# Patient Record
Sex: FEMALE | Race: WHITE | NOT HISPANIC OR LATINO | Employment: FULL TIME | URBAN - METROPOLITAN AREA
[De-identification: names, ages, dates, MRNs, and addresses within clinical notes are randomized per-mention and may not be internally consistent; named-entity substitution may affect disease eponyms.]

---

## 2023-05-26 ENCOUNTER — HOSPITAL ENCOUNTER (OUTPATIENT)
Dept: PREADMISSION TESTING | Facility: HOSPITAL | Age: 43
Discharge: HOME | End: 2023-05-26
Attending: INTERNAL MEDICINE

## 2023-05-26 DIAGNOSIS — Z12.11 COLON CANCER SCREENING: ICD-10-CM

## 2024-01-07 ENCOUNTER — OFFICE VISIT (OUTPATIENT)
Dept: URGENT CARE | Facility: CLINIC | Age: 44
End: 2024-01-07
Payer: COMMERCIAL

## 2024-01-07 VITALS
WEIGHT: 161 LBS | HEIGHT: 64 IN | HEART RATE: 93 BPM | SYSTOLIC BLOOD PRESSURE: 140 MMHG | RESPIRATION RATE: 19 BRPM | TEMPERATURE: 98 F | DIASTOLIC BLOOD PRESSURE: 88 MMHG | OXYGEN SATURATION: 97 % | BODY MASS INDEX: 27.49 KG/M2

## 2024-01-07 DIAGNOSIS — R03.0 ELEVATED BLOOD PRESSURE READING IN OFFICE WITHOUT DIAGNOSIS OF HYPERTENSION: ICD-10-CM

## 2024-01-07 DIAGNOSIS — J34.89 SINUS PRESSURE: Primary | ICD-10-CM

## 2024-01-07 PROCEDURE — 99203 OFFICE O/P NEW LOW 30 MIN: CPT | Mod: S$GLB,,,

## 2024-01-07 RX ORDER — DEXTROMETHORPHAN HYDROBROMIDE AND GUAIFENESIN 10; 200 MG/1; MG/1
2 CAPSULE, GELATIN COATED ORAL EVERY 4 HOURS PRN
Qty: 20 EACH | Refills: 0 | Status: SHIPPED | OUTPATIENT
Start: 2024-01-07 | End: 2024-01-12

## 2024-01-07 RX ORDER — DEXTROAMPHETAMINE SULFATE 15 MG/1
15 CAPSULE, EXTENDED RELEASE ORAL 3 TIMES DAILY
COMMUNITY
Start: 2023-12-14

## 2024-01-07 NOTE — PROGRESS NOTES
"Subjective:      Patient ID: Rubina Park is a 43 y.o. female.    Vitals:  height is 5' 4" (1.626 m) and weight is 73 kg (161 lb). Her oral temperature is 98.3 °F (36.8 °C). Her blood pressure is 140/88 (abnormal) and her pulse is 93. Her respiration is 19 and oxygen saturation is 97%.     Chief Complaint: Sinus Problem    44 yo female Patient presents to clinic with sinus pressure/congestion onset 2 days, no recorded fever. Noticed BP was elevated in clinic without hx of HTN. Denies headache, vision changes, weakness, fatigue, speech difficulty, back pain, cp, sob. Sates she has been taking 2 different decongestants. No known sick contacts. "I was not sure if I needed a steroid shot or anything but I figured I would come in because I was checking in my daughter." NKDA.     Sinus Problem  This is a new problem. The current episode started in the past 7 days. There has been no fever. Associated symptoms include congestion, headaches and sinus pressure. Pertinent negatives include no chills, coughing, ear pain, neck pain, shortness of breath, sneezing or sore throat.       Constitution: Negative for chills and fever.   HENT:  Positive for congestion and sinus pressure. Negative for ear pain, ear discharge, sinus pain, sore throat, trouble swallowing and voice change.    Neck: Negative for neck pain and neck stiffness.   Cardiovascular:  Negative for chest pain.   Eyes:  Negative for eye discharge, eye itching, eye redness, double vision and blurred vision.   Respiratory:  Negative for cough and shortness of breath.    Allergic/Immunologic: Negative for sneezing.   Neurological:  Positive for headaches.      Objective:     Vitals:    01/07/24 1644   BP: (!) 140/88   Pulse:    Resp:    Temp:        Physical Exam   Constitutional: She is oriented to person, place, and time. She appears well-developed. She is cooperative.  Non-toxic appearance. She does not appear ill. No distress.   HENT:   Head: Normocephalic and " atraumatic.   Ears:   Right Ear: Hearing, tympanic membrane, external ear and ear canal normal. No cerumen not present. Tympanic membrane is not erythematous and not bulging.   Left Ear: Hearing, tympanic membrane, external ear and ear canal normal. No cerumen not present. Tympanic membrane is not erythematous and not bulging.   Nose: Nose normal. No mucosal edema, rhinorrhea or nasal deformity. No epistaxis. Right sinus exhibits no maxillary sinus tenderness and no frontal sinus tenderness. Left sinus exhibits no maxillary sinus tenderness and no frontal sinus tenderness.   Mouth/Throat: Uvula is midline, oropharynx is clear and moist and mucous membranes are normal. Mucous membranes are moist. No trismus in the jaw. Normal dentition. No uvula swelling. No oropharyngeal exudate, posterior oropharyngeal edema, posterior oropharyngeal erythema or cobblestoning.   Eyes: Conjunctivae and lids are normal. Pupils are equal, round, and reactive to light. Right eye exhibits no discharge. Left eye exhibits no discharge. No scleral icterus.   Neck: Trachea normal and phonation normal. Neck supple. No edema present. No erythema present. No neck rigidity present.   Cardiovascular: Normal rate, regular rhythm, normal heart sounds and normal pulses.   Pulmonary/Chest: Effort normal and breath sounds normal. No accessory muscle usage or stridor. No respiratory distress. She has no decreased breath sounds. She has no wheezes. She has no rhonchi. She has no rales.   Abdominal: Normal appearance.   Musculoskeletal: Normal range of motion.         General: No deformity. Normal range of motion.   Neurological: She is alert and oriented to person, place, and time. She displays no weakness. She exhibits normal muscle tone. Coordination and gait normal.   Skin: Skin is warm, dry, intact, not diaphoretic, not pale and no rash.   Psychiatric: Her speech is normal and behavior is normal. Judgment and thought content normal.   Nursing note  and vitals reviewed.      Assessment:     1. Sinus pressure    2. Elevated blood pressure reading in office without diagnosis of hypertension        Plan:       Sinus pressure  -     dextromethorphan-guaiFENesin (CORICIDIN HBP CHEST GRICEL-COUGH)  mg Cap; Take 2 capsules by mouth every 4 (four) hours as needed (congestion).  Dispense: 20 each; Refill: 0    Elevated blood pressure reading in office without diagnosis of hypertension        Patient Instructions   PLEASE READ YOUR DISCHARGE INSTRUCTIONS ENTIRELY AS IT CONTAINS IMPORTANT INFORMATION.      - Please drink plenty of fluids.  - Please get plenty of rest.  - You can take plain Mucinex (guaifenesin) 1200 mg twice a day to help loosen mucous.   - Use over the counter Flonase as directed  Please return here or go to the Emergency Department for any concerns or worsening of condition.  - Please take an over the counter antihistamine medication (Allegra/Claritin/Zyrtec/Xyzal) of your choice as directed. These are antihistamines that can help with runny nose, nasal congestion, sneezing, and helps to dry up post-nasal drip, which usually causes sore throat and cough.    -If you do NOT have high blood pressure, you may use a decongestant form (D)  of this medication (ie. Claritin- D, zyrtec-D, allegra-D, Mucinex-D) or if you do not take the D form, you can take sudafed (pseudoephedrine) over the counter, which is a decongestant. Do NOT take two decongestant (D) medications at the same time (such as mucinex-D and claritin-D or plain sudafed and claritin D). Dextromethorphan (DM) is a cough suppressant over the counter (ie. mucinex DM, robitussin, delsym; dayquil/nyquil has DM as well.)    If you do have Hypertension or palpitations, it is safe to take Coricidin HBP for relief of sinus symptoms.    - If not allergic, please take over the counter Tylenol (Acetaminophen) and/or Motrin (Ibuprofen) as directed for control of pain and/or fever.  Avoid tylenol if you  have a history of liver disease. Do not take ibuprofen if you have a history of GI bleeding, kidney disease, or if you take blood thinners.  Please follow up with your primary care doctor or specialist as needed.    -IF YOU RECEIVED PRESCRIPTION COUGH SUPPRESSANTS: Take prescription cough meds (pills) as prescribed; take prescription cough syrup at night as needed for cough.  Do not take both the prescribed cough pills and syrup at the same time or within 6 hours of each other.  Do not take the cough syrup with any other sedative medication as it can can cause drowsiness. Do not operate any heavy machinery, drink or drive while taking the cough syrup.    Try taking half a dose first of the cough syrup to see how it affects you.     Sore throat recommendations: Warm fluids, warm salt water gargles, throat lozenges, tea, honey, soup, rest, hydration.    Use over the counter flonase: one spray each nostril twice daily OR two sprays each nostril once daily for sinus congestion and postnasal drip. This is a steroid nasal spray that works locally over time to decrease the inflammation in your nose/sinuses and help with allergic symptoms. This is not an quick- relief spray like afrin, but it works well if used daily.  Discontinue if you develop nose bleed    Sinus rinses DO NOT USE TAP WATER, if you must, water must be a rolling boil for 1 minute, let it cool, then use.  May use distilled water, or over the counter nasal saline rinses.  Vics vapor rub in shower to help open nasal passages.  May use nasal gel to keep passages moisturized.  May use Nasal saline sprays during the day for added relief of congestion.   For those who go to the gym, please do not use the sauna or steam room now to clear sinuses.    If you  smoke, please stop smoking.      Please return or see your primary care doctor if you develop new or worsening symptoms.     Please arrange follow up with your primary medical clinic as soon as possible. You  must understand that you've received an Urgent Care treatment only and that you may be released before all of your medical problems are known or treated. You, the patient, will arrange for follow up as instructed. If your symptoms worsen or fail to improve you should go to the Emergency Room.      Medical Decision Making:   Urgent Care Management:  Rechecked BP manually with improvement to 144/88. Without history of HTN, suspect elevated BP was due to cough medications. Prescribed Coricidin to treat symptoms. Advised patient that symptoms are indicative of an upper respiratory infection which is viral in nature and should be treated symptomatically.  We discussed over-the-counter medications as well as home remedies to help with current symptoms.  We also discussed a wait and see antibiotic plan which the patient verbalized understanding.  Patient educational handouts also included in discharge paperwork for patient who verbalized understanding agrees with plan of care.  They deny any further questions or concerns at this time.  Patient exits exam room in no acute distress.

## 2024-01-07 NOTE — PATIENT INSTRUCTIONS
PLEASE READ YOUR DISCHARGE INSTRUCTIONS ENTIRELY AS IT CONTAINS IMPORTANT INFORMATION.      - Please drink plenty of fluids.  - Please get plenty of rest.  - You can take plain Mucinex (guaifenesin) 1200 mg twice a day to help loosen mucous.   - Use over the counter Flonase as directed  Please return here or go to the Emergency Department for any concerns or worsening of condition.  - Please take an over the counter antihistamine medication (Allegra/Claritin/Zyrtec/Xyzal) of your choice as directed. These are antihistamines that can help with runny nose, nasal congestion, sneezing, and helps to dry up post-nasal drip, which usually causes sore throat and cough.    -If you do NOT have high blood pressure, you may use a decongestant form (D)  of this medication (ie. Claritin- D, zyrtec-D, allegra-D, Mucinex-D) or if you do not take the D form, you can take sudafed (pseudoephedrine) over the counter, which is a decongestant. Do NOT take two decongestant (D) medications at the same time (such as mucinex-D and claritin-D or plain sudafed and claritin D). Dextromethorphan (DM) is a cough suppressant over the counter (ie. mucinex DM, robitussin, delsym; dayquil/nyquil has DM as well.)    If you do have Hypertension or palpitations, it is safe to take Coricidin HBP for relief of sinus symptoms.    - If not allergic, please take over the counter Tylenol (Acetaminophen) and/or Motrin (Ibuprofen) as directed for control of pain and/or fever.  Avoid tylenol if you have a history of liver disease. Do not take ibuprofen if you have a history of GI bleeding, kidney disease, or if you take blood thinners.  Please follow up with your primary care doctor or specialist as needed.    -IF YOU RECEIVED PRESCRIPTION COUGH SUPPRESSANTS: Take prescription cough meds (pills) as prescribed; take prescription cough syrup at night as needed for cough.  Do not take both the prescribed cough pills and syrup at the same time or within 6 hours of  each other.  Do not take the cough syrup with any other sedative medication as it can can cause drowsiness. Do not operate any heavy machinery, drink or drive while taking the cough syrup.    Try taking half a dose first of the cough syrup to see how it affects you.     Sore throat recommendations: Warm fluids, warm salt water gargles, throat lozenges, tea, honey, soup, rest, hydration.    Use over the counter flonase: one spray each nostril twice daily OR two sprays each nostril once daily for sinus congestion and postnasal drip. This is a steroid nasal spray that works locally over time to decrease the inflammation in your nose/sinuses and help with allergic symptoms. This is not an quick- relief spray like afrin, but it works well if used daily.  Discontinue if you develop nose bleed    Sinus rinses DO NOT USE TAP WATER, if you must, water must be a rolling boil for 1 minute, let it cool, then use.  May use distilled water, or over the counter nasal saline rinses.  Vics vapor rub in shower to help open nasal passages.  May use nasal gel to keep passages moisturized.  May use Nasal saline sprays during the day for added relief of congestion.   For those who go to the gym, please do not use the sauna or steam room now to clear sinuses.    If you  smoke, please stop smoking.      Please return or see your primary care doctor if you develop new or worsening symptoms.     Please arrange follow up with your primary medical clinic as soon as possible. You must understand that you've received an Urgent Care treatment only and that you may be released before all of your medical problems are known or treated. You, the patient, will arrange for follow up as instructed. If your symptoms worsen or fail to improve you should go to the Emergency Room.

## 2025-01-06 ENCOUNTER — OFFICE VISIT (OUTPATIENT)
Dept: INTERNAL MEDICINE | Facility: CLINIC | Age: 45
End: 2025-01-06
Payer: COMMERCIAL

## 2025-01-06 ENCOUNTER — HOSPITAL ENCOUNTER (OUTPATIENT)
Dept: CARDIOLOGY | Facility: HOSPITAL | Age: 45
Discharge: HOME | End: 2025-01-06
Attending: FAMILY MEDICINE
Payer: COMMERCIAL

## 2025-01-06 ENCOUNTER — PATIENT MESSAGE (OUTPATIENT)
Dept: PREADMISSION TESTING | Facility: HOSPITAL | Age: 45
End: 2025-01-06
Payer: COMMERCIAL

## 2025-01-06 VITALS
WEIGHT: 162.94 LBS | BODY MASS INDEX: 27.82 KG/M2 | SYSTOLIC BLOOD PRESSURE: 132 MMHG | HEIGHT: 64 IN | OXYGEN SATURATION: 96 % | HEART RATE: 100 BPM | TEMPERATURE: 95 F | DIASTOLIC BLOOD PRESSURE: 72 MMHG

## 2025-01-06 DIAGNOSIS — Z00.00 ANNUAL PHYSICAL EXAM: Primary | ICD-10-CM

## 2025-01-06 DIAGNOSIS — Z82.49 FAMILY HISTORY OF HEART DISEASE: ICD-10-CM

## 2025-01-06 DIAGNOSIS — Z00.00 ANNUAL PHYSICAL EXAM: ICD-10-CM

## 2025-01-06 DIAGNOSIS — Z23 NEED FOR PNEUMOCOCCAL VACCINATION: ICD-10-CM

## 2025-01-06 DIAGNOSIS — F17.200 CURRENT SMOKER: ICD-10-CM

## 2025-01-06 DIAGNOSIS — F32.5 MAJOR DEPRESSIVE DISORDER WITH SINGLE EPISODE, IN FULL REMISSION: ICD-10-CM

## 2025-01-06 DIAGNOSIS — F90.0 ADHD, PREDOMINANTLY INATTENTIVE TYPE: ICD-10-CM

## 2025-01-06 DIAGNOSIS — Z12.11 SCREENING FOR COLON CANCER: ICD-10-CM

## 2025-01-06 DIAGNOSIS — Z23 NEED FOR TDAP VACCINATION: ICD-10-CM

## 2025-01-06 LAB
OHS QRS DURATION: 76 MS
OHS QTC CALCULATION: 452 MS

## 2025-01-06 PROCEDURE — 3078F DIAST BP <80 MM HG: CPT | Mod: CPTII,S$GLB,, | Performed by: FAMILY MEDICINE

## 2025-01-06 PROCEDURE — 90677 PCV20 VACCINE IM: CPT | Mod: S$GLB,,, | Performed by: FAMILY MEDICINE

## 2025-01-06 PROCEDURE — 3008F BODY MASS INDEX DOCD: CPT | Mod: CPTII,S$GLB,, | Performed by: FAMILY MEDICINE

## 2025-01-06 PROCEDURE — 90471 IMMUNIZATION ADMIN: CPT | Mod: S$GLB,,, | Performed by: FAMILY MEDICINE

## 2025-01-06 PROCEDURE — 99999 PR PBB SHADOW E&M-EST. PATIENT-LVL V: CPT | Mod: PBBFAC,,, | Performed by: FAMILY MEDICINE

## 2025-01-06 PROCEDURE — 93005 ELECTROCARDIOGRAM TRACING: CPT

## 2025-01-06 PROCEDURE — 90472 IMMUNIZATION ADMIN EACH ADD: CPT | Mod: S$GLB,,, | Performed by: FAMILY MEDICINE

## 2025-01-06 PROCEDURE — 1159F MED LIST DOCD IN RCRD: CPT | Mod: CPTII,S$GLB,, | Performed by: FAMILY MEDICINE

## 2025-01-06 PROCEDURE — 3075F SYST BP GE 130 - 139MM HG: CPT | Mod: CPTII,S$GLB,, | Performed by: FAMILY MEDICINE

## 2025-01-06 PROCEDURE — 93010 ELECTROCARDIOGRAM REPORT: CPT | Mod: ,,, | Performed by: INTERNAL MEDICINE

## 2025-01-06 PROCEDURE — 99386 PREV VISIT NEW AGE 40-64: CPT | Mod: 25,S$GLB,, | Performed by: FAMILY MEDICINE

## 2025-01-06 PROCEDURE — 90715 TDAP VACCINE 7 YRS/> IM: CPT | Mod: S$GLB,,, | Performed by: FAMILY MEDICINE

## 2025-01-06 NOTE — PROGRESS NOTES
Subjective:   Patient ID: Rubina Park is a 44 y.o. female.  Chief Complaint:  Establish Care    Presents for annual exam/establish care   No current PCP   Followed by external gynecology and mental health provider     Medical History:  -depression.  On Lexapro 20 mg daily, Wellbutrin  mg daily, and Klonopin 0.5 mg daily as needed.  Reports compliance.  Denies side effects.  States mood and symptoms stable and well controlled on current medication.    -ADHD.  On Adderall 30 mg twice a day.  Reports compliance.  Denies side effects.  States symptoms stable and well controlled on current medication.    - Current smoker.  Not interested in cessation.  Greater than 20 pack-year history.  No previous pneumonia vaccine.    No family history colon cancer   Gyn exam up-to-date   Reports mammogram up-to-date   No previous colon cancer screening   Needs tetanus booster   Needs COVID booster   Needs pneumonia vaccine   Needs flu vaccine  Previous HIV screening negative   No hepatitis-C screening on file     Concerns today include whether or not she needs lung cancer screening in addition to obtaining EKG due to family history of heart disease  No active cough or other symptoms of lung cancer   No current cardiac complaints      Review of Systems   Constitutional:  Negative for chills, fatigue and fever.   HENT:  Negative for congestion, dental problem, ear pain, postnasal drip, rhinorrhea, sinus pressure and sore throat.    Eyes:  Negative for visual disturbance.   Respiratory:  Negative for cough, chest tightness, shortness of breath and wheezing.    Cardiovascular:  Negative for chest pain, palpitations and leg swelling.   Gastrointestinal:  Negative for abdominal pain, blood in stool, constipation, diarrhea, nausea and vomiting.   Endocrine: Negative for polydipsia, polyphagia and polyuria.   Genitourinary:  Negative for difficulty urinating, dysuria, flank pain, hematuria and pelvic pain.   Musculoskeletal:   "Negative for myalgias.   Skin:  Negative for rash.   Neurological:  Negative for dizziness, syncope, weakness, light-headedness and headaches.   Hematological:  Negative for adenopathy.   Psychiatric/Behavioral:  Negative for decreased concentration, dysphoric mood and sleep disturbance. The patient is not nervous/anxious.        Objective:   /72 (BP Location: Right arm, Patient Position: Sitting)   Pulse 100   Temp (!) 95 °F (35 °C) (Tympanic)   Ht 5' 4" (1.626 m)   Wt 73.9 kg (162 lb 14.7 oz)   SpO2 96%   BMI 27.97 kg/m²     Physical Exam  Vitals and nursing note reviewed.   Constitutional:       Appearance: Normal appearance. She is well-developed and overweight.   HENT:      Right Ear: Tympanic membrane and ear canal normal.      Left Ear: Tympanic membrane and ear canal normal.      Mouth/Throat:      Pharynx: Oropharynx is clear. Uvula midline.   Neck:      Thyroid: No thyroid mass, thyromegaly or thyroid tenderness.   Cardiovascular:      Rate and Rhythm: Normal rate and regular rhythm.      Heart sounds: Normal heart sounds.   Pulmonary:      Effort: Pulmonary effort is normal.      Breath sounds: Normal breath sounds.   Abdominal:      General: There is no distension.      Palpations: Abdomen is soft.      Tenderness: There is no abdominal tenderness.   Musculoskeletal:      Right lower leg: No edema.      Left lower leg: No edema.   Skin:     Findings: No rash.   Neurological:      Mental Status: She is alert and oriented to person, place, and time.   Psychiatric:         Mood and Affect: Mood and affect normal.         Assessment:       ICD-10-CM ICD-9-CM   1. Annual physical exam  Z00.00 V70.0   2. Screening for colon cancer  Z12.11 V76.51   3. Need for Tdap vaccination  Z23 V06.1   4. Need for pneumococcal vaccination  Z23 V03.82   5. Family history of heart disease  Z82.49 V17.49   6. Major depressive disorder with single episode, in full remission  F32.5 296.26   7. ADHD, predominantly " inattentive type  F90.0 314.00   8. Current smoker  F17.200 305.1     Plan:   Annual physical exam  Screening for colon cancer  Need for Tdap vaccination  Need for pneumococcal vaccination  -     CBC Without Differential; Future; Expected date: 01/06/2025  -     Comprehensive Metabolic Panel; Future; Expected date: 01/06/2025  -     Lipid Panel; Future; Expected date: 01/06/2025  -     TSH; Future; Expected date: 01/06/2025  -     Hemoglobin A1C; Future; Expected date: 01/06/2025  -     Hepatitis C Antibody; Future; Expected date: 01/06/2025  -     Ambulatory referral/consult to Endo Procedure ; Future; Expected date: 01/07/2025  -     Tdap (BOOSTRIX) vaccine injection 0.5 mL  -     pneumoc 20-janel conj-dip cr(PF) (PREVNAR-20 (PF)) injection Syrg 0.5 mL  -     EKG 12-lead; Future; Expected date: 01/06/2025  Blood pressure normal.  BMI 28.  Overall exam unremarkable.    Check labs.  Treat as indicated.    Colonoscopy ordered for colon cancer screening   Gyn exam up-to-date   Reports mammogram up-to-date   Tetanus booster today   Declines COVID booster   Pneumonia vaccine today   Declines flu vaccine      Family history of heart disease  -     EKG 12-lead; Future; Expected date: 01/06/2025  Asymptomatic   Check EKG   Treat as indicated     Major depressive disorder with single episode, in full remission  Stable, no side effects, mood and symptoms well controlled on present medication .  Continue Lexapro, Wellbutrin XL, and Klonopin from external provider    ADHD, predominantly inattentive type  ADHD stable, no side effects, symptoms well controlled on current medication  Continue Adderall from external provider    Current smoker  -     pneumoc 20-janel conj-dip cr(PF) (PREVNAR-20 (PF)) injection Syrg 0.5 mL  Not interested in cessation   Agrees to pneumonia vaccine   Discussed lung cancer screening recommendations to start with low-dose CT scan at 48 years old    Return to clinic 1 year for annual exam or sooner  as needed

## 2025-01-09 DIAGNOSIS — R76.8 HEPATITIS C ANTIBODY TEST POSITIVE: Primary | ICD-10-CM

## 2025-01-14 ENCOUNTER — HOSPITAL ENCOUNTER (OUTPATIENT)
Dept: PREADMISSION TESTING | Facility: HOSPITAL | Age: 45
Discharge: HOME | End: 2025-01-14
Attending: FAMILY MEDICINE
Payer: COMMERCIAL

## 2025-01-14 DIAGNOSIS — Z00.00 ANNUAL PHYSICAL EXAM: ICD-10-CM

## 2025-01-14 DIAGNOSIS — Z12.11 SCREENING FOR COLON CANCER: ICD-10-CM

## 2025-01-16 ENCOUNTER — OFFICE VISIT (OUTPATIENT)
Dept: URGENT CARE | Facility: CLINIC | Age: 45
End: 2025-01-16
Payer: COMMERCIAL

## 2025-01-16 VITALS
WEIGHT: 162 LBS | HEART RATE: 84 BPM | BODY MASS INDEX: 27.66 KG/M2 | SYSTOLIC BLOOD PRESSURE: 150 MMHG | TEMPERATURE: 99 F | DIASTOLIC BLOOD PRESSURE: 82 MMHG | OXYGEN SATURATION: 98 % | RESPIRATION RATE: 18 BRPM | HEIGHT: 64 IN

## 2025-01-16 DIAGNOSIS — F17.200 SMOKER: ICD-10-CM

## 2025-01-16 DIAGNOSIS — J06.9 UPPER RESPIRATORY TRACT INFECTION, UNSPECIFIED TYPE: ICD-10-CM

## 2025-01-16 DIAGNOSIS — R05.9 COUGH, UNSPECIFIED TYPE: Primary | ICD-10-CM

## 2025-01-16 DIAGNOSIS — R03.0 ELEVATED BLOOD PRESSURE READING IN OFFICE WITHOUT DIAGNOSIS OF HYPERTENSION: ICD-10-CM

## 2025-01-16 LAB
CTP QC/QA: YES
MOLECULAR STREP A: NEGATIVE
POC MOLECULAR INFLUENZA A AGN: NEGATIVE
POC MOLECULAR INFLUENZA B AGN: NEGATIVE
SARS-COV-2 AG RESP QL IA.RAPID: NEGATIVE

## 2025-01-16 PROCEDURE — 87811 SARS-COV-2 COVID19 W/OPTIC: CPT | Mod: QW,S$GLB,, | Performed by: NURSE PRACTITIONER

## 2025-01-16 PROCEDURE — 87651 STREP A DNA AMP PROBE: CPT | Mod: QW,S$GLB,, | Performed by: NURSE PRACTITIONER

## 2025-01-16 PROCEDURE — 99214 OFFICE O/P EST MOD 30 MIN: CPT | Mod: S$GLB,,, | Performed by: NURSE PRACTITIONER

## 2025-01-16 PROCEDURE — 87502 INFLUENZA DNA AMP PROBE: CPT | Mod: QW,S$GLB,, | Performed by: NURSE PRACTITIONER

## 2025-01-16 RX ORDER — DEXTROMETHORPHAN HYDROBROMIDE AND GUAIFENESIN 10; 200 MG/1; MG/1
2 CAPSULE, GELATIN COATED ORAL
Qty: 40 EACH | Refills: 0 | Status: SHIPPED | OUTPATIENT
Start: 2025-01-16 | End: 2025-01-16

## 2025-01-16 RX ORDER — DEXTROMETHORPHAN HYDROBROMIDE AND GUAIFENESIN 10; 200 MG/1; MG/1
2 CAPSULE, GELATIN COATED ORAL
Qty: 40 EACH | Refills: 0 | Status: SHIPPED | OUTPATIENT
Start: 2025-01-16 | End: 2025-01-26

## 2025-01-16 RX ORDER — BENZONATATE 200 MG/1
200 CAPSULE ORAL 3 TIMES DAILY PRN
Qty: 25 CAPSULE | Refills: 0 | Status: SHIPPED | OUTPATIENT
Start: 2025-01-16 | End: 2025-01-16

## 2025-01-16 RX ORDER — BENZONATATE 200 MG/1
200 CAPSULE ORAL 3 TIMES DAILY PRN
Qty: 25 CAPSULE | Refills: 0 | Status: SHIPPED | OUTPATIENT
Start: 2025-01-16 | End: 2025-01-26

## 2025-01-16 NOTE — PATIENT INSTRUCTIONS
A cold is caused by a virus that can settle in your nose, throat or lungs. This causesa runny or stuffy nose and sneezing. You may also have a sore throat, cough, headache, fever and muscle aches. Different cold viruses last different lengthsof time, but the average time is 2 to 14 days.    Seek immediate medical care if you develop fever, chest pain, or shortness of breath.     Treatment: There is no cure for the common cold, there is only symptomatic care.     Antibiotics may be used to treat signs of a secondary infection, but they do not treat  the cold virus. Try these tips to keep yourself comfortable:                   -Get plenty of rest.                   -Drink plenty of fluids, at least 8 large glasses of fluid a day. Good fluid choices are water, fruit juices high in Vitamin C, tea, gelatin, or broths and soups. These help to keep mucus thin and ease congestion.                  -Use salt water gargle, cough drops or throat sprays to relieve throat pain. Mi ¼ to ½ teaspoon of salt in 1 cup of warm water for a salt water gargle  solution.                  -Use petroleum jelly or lip balm around lips and nose to prevent chapping.                  -Use saline nose drops or spray to help ease congestion.    Over the Counter (OTC) Medicines:  Take over the counter medicines as needed to ease your signs.  Read labels carefully.  Use a product that treats only the signs that you have. Ask your pharmacist  for recommendations. Be sure to ask about possible interactions with other  medicines you are taking.  Common medicines used to treat signs of a cold include:     -Flonase daily.  -Claritin or Zyrtec daily.  -Mucinex every 12 hours -- Drink plenty of water while taking this medication.    - Cough suppressant, also called antitussive, such as dextromethorphan.  This medicine decreases your reflex and sensitivity to cough. This  medicine may be kept behind the pharmacy counter for purchase.    Cold and cough  medicines often contain more than one type of medicine.  Ask the pharmacist for help to confirm that you are not using more than one  product with the same or similar ingredient. For example, some cold and  cough medicines have acetaminophen or ibuprofen in them to help lower a  fever or ease muscle aches. Do not take extra acetaminophen (Tylenol) or  ibuprofen (Advil, Motrin) if the cold or cough medicine has it as an  ingredient. Too much medicine could be harmful.    Take the correct dose as listed on the package. Do not take more than  recommended.    Use a Humidifier:  A cool mist humidifier can make breathing easier by thinning mucus. Do not use  a steam humidifier as hot water can cause burns if spilled.  Place the humidifier a few feet from the bed. Drain and clean each day with  soap and water to prevent bacteria and mold from growing.  Indoor humidity should not be above 50%. Stop using the humidifier if you  notice moisture on windows, walls or pictures.  You do not need to add any medicine to the humidifier.  If you cannot get a humidifier, place a pan of water next to heating vents and  refill the water level daily. The water will evaporate and add moisture to the  Room.    How to prevent the spread of colds  -Wash your hands with soap and water or use alcohol based hand   often. Dry hands wet from washing with soap on a paper towel instead of cloth towel.  -Cough or sneeze into your elbow to avoid spreading germs.  -Wipe down common surfaces, such as door knobs and faucet handles, with a disinfectant spray.  -Do not share cups or utensils.        Please follow up with your Primary care provider within 2-5 days if your signs and symptoms have not resolved or worsen.      If your condition worsens or fails to improve we recommend that you receive another evaluation at the emergency room immediately or contact your primary medical clinic to discuss your concerns.   You must understand that you  have received an Urgent Care treatment only and that you may be released before all of your medical problems are known or treated. You, the patient, will arrange for follow up care as instructed.   Tobacco Cessation  Smoking, vaping, and smokeless tobacco cause harm by raising blood pressure and increasing your risk of heart attack and stroke. Chewing tobacco increases your risk of cancer of the face and throat. Smoking not only raises the risk of cancer, but more often, causes emphysema. This crippling lung disease can cause constant shortness of breath and a need to use oxygen. Stopping smoking can make the difference between playing with your grandchildren outside and sitting by with your oxygen tank watching them.     You may already know your nicotine habit is dangerous, but perhaps you feel helpless or dont know where to start.    Here at [facility] we are invested in the improvement of your health. We would be happy to help you with smoking cessation. If you are interested in quitting and answer yes to   the questions below, we can provide you with FREE assistance to get you on your way.     ? Are you a [place] resident?  ? Did you start smoking before September 1, 1988?  ? Are you ready to quit smoking?    Quitting doesnt have to be lonely -   [facility] Smoking [facility]ess[facility] program offers a supportive environment along with      FREE smoking cessation counseling to help get you through those rough patches   FREE or reduced medications* to help curb the cravings    You do not need to be an [facility] patient to participate in the program.  Ready? Call [phone] or toll free [phone] or visit [email or web address] to sign up for the program.

## 2025-01-16 NOTE — PROGRESS NOTES
"Subjective:      Patient ID: Rubina Park is a 44 y.o. female.    Vitals:  height is 5' 4" (1.626 m) and weight is 73.5 kg (162 lb). Her oral temperature is 98.7 °F (37.1 °C). Her blood pressure is 150/82 (abnormal) and her pulse is 84. Her respiration is 18 and oxygen saturation is 98%.     Chief Complaint: Cough    Pt is presenting a cough and congestion, that started lastnight.  The only medication that she has taken for her symptoms is ibuprofen.     Cough  This is a new problem. The current episode started yesterday. The problem has been unchanged. The problem occurs every few minutes. The cough is Non-productive. Associated symptoms include headaches, nasal congestion, postnasal drip and a sore throat. Pertinent negatives include no chest pain, chills, ear congestion, ear pain, fever, heartburn, hemoptysis, myalgias, rash, rhinorrhea, shortness of breath, sweats, weight loss or wheezing. Nothing aggravates the symptoms. She has tried nothing for the symptoms. The treatment provided mild relief. There is no history of asthma, bronchiectasis, bronchitis, COPD, emphysema, environmental allergies or pneumonia.       Constitution: Negative for chills and fever.   HENT:  Positive for congestion, postnasal drip and sore throat. Negative for ear pain.    Cardiovascular:  Negative for chest pain.   Respiratory:  Positive for cough. Negative for bloody sputum, shortness of breath, stridor and wheezing.    Gastrointestinal:  Negative for nausea, vomiting, diarrhea and heartburn.   Musculoskeletal:  Negative for muscle ache.   Skin:  Negative for rash.   Allergic/Immunologic: Negative for environmental allergies.   Neurological:  Positive for headaches.      Objective:     Physical Exam   Constitutional: She is oriented to person, place, and time. She appears well-developed. She is cooperative.  Non-toxic appearance. She does not appear ill. No distress.   HENT:   Head: Normocephalic and atraumatic.   Ears:   Right Ear: " Hearing and external ear normal.   Left Ear: Hearing and external ear normal.   Nose: Rhinorrhea and congestion present. No mucosal edema or nasal deformity. No epistaxis. Right sinus exhibits no maxillary sinus tenderness and no frontal sinus tenderness. Left sinus exhibits no maxillary sinus tenderness and no frontal sinus tenderness.   Mouth/Throat: Uvula is midline, oropharynx is clear and moist and mucous membranes are normal. No trismus in the jaw. Normal dentition. No uvula swelling. No oropharyngeal exudate, posterior oropharyngeal edema or posterior oropharyngeal erythema.   Eyes: Conjunctivae and lids are normal. No scleral icterus.   Neck: Trachea normal and phonation normal. Neck supple. No edema present. No erythema present. No neck rigidity present.   Cardiovascular: Normal rate, regular rhythm, normal heart sounds and normal pulses.   Pulmonary/Chest: Effort normal and breath sounds normal. No respiratory distress. She has no decreased breath sounds. She has no wheezes. She has no rhonchi. She has no rales.   Abdominal: Normal appearance.   Musculoskeletal: Normal range of motion.         General: No deformity. Normal range of motion.   Neurological: She is alert and oriented to person, place, and time. She exhibits normal muscle tone. Coordination normal.   Skin: Skin is warm, dry, intact, not diaphoretic and not pale.   Psychiatric: Her speech is normal and behavior is normal. Judgment and thought content normal.   Nursing note and vitals reviewed.      Assessment:     1. Cough, unspecified type    2. Smoker    3. Elevated blood pressure reading in office without diagnosis of hypertension    4. Upper respiratory tract infection, unspecified type        Plan:       Cough, unspecified type  -     SARS Coronavirus 2 Antigen, POCT Manual Read  -     POCT Strep A, Molecular  -     POCT Influenza A/B MOLECULAR    Smoker    Elevated blood pressure reading in office without diagnosis of  hypertension    Upper respiratory tract infection, unspecified type    Other orders  -     dextromethorphan-guaiFENesin (CORICIDIN HBP CHEST GRICEL-COUGH)  mg Cap; Take 2 capsules by mouth every 6 (six) hours while awake. for 10 days  Dispense: 40 each; Refill: 0  -     benzonatate (TESSALON) 200 MG capsule; Take 1 capsule (200 mg total) by mouth 3 (three) times daily as needed for Cough.  Dispense: 25 capsule; Refill: 0          Medical Decision Making:   Initial Assessment:   After complete evaluation, including thorough history and physical exam, the patient's symptoms are most likely due to viral upper respiratory infection. There are no concerning features on physical exam to suggest bacterial otitis media/externa, sinusitis, strep pharyngitis, or peritonsillar abscess. Vital signs do not suggest sepsis. Lung sounds are clear and not consistent with pneumonia. There is no neck pain or limited ROM to suggest retropharyngeal abscess or meningitis. In clinic testing for covid/flu/strep is negative.The patient will be treated with supportive care. Will provide RX for tessalon upon D/C. Follow up instructions and ED precautions provided.     Patient is noted to be a current smoker. Evidence shows that smoking causes cancer, heart disease, stroke, lung diseases, diabetes, and chronic obstructive pulmonary disease (COPD), which includes emphysema and chronic bronchitis. Smoking also increases risk for tuberculosis, certain eye diseases, and problems of the immune system, including rheumatoid arthritis. Furthermore smoking can slow the process of wound healing and prolong the symptoms of respiratory illness.  Patient counseled on dangers of smoking and offered smoking cessation.   Patient is seen in clinic with no history of essential hypertension noted to be elevated today in clinic.  Patient was counseled that blood pressure was elevated. I advised adherence to a low-salt heart smart diet, exercise and  self-monitoring.  Patient follow up with primary physician for further evaluation and possible management if hypertension persists.           Results for orders placed or performed in visit on 01/16/25   SARS Coronavirus 2 Antigen, POCT Manual Read    Collection Time: 01/16/25 12:24 PM   Result Value Ref Range    SARS Coronavirus 2 Antigen Negative Negative     Acceptable Yes    POCT Strep A, Molecular    Collection Time: 01/16/25 12:24 PM   Result Value Ref Range    Molecular Strep A, POC Negative Negative     Acceptable Yes    POCT Influenza A/B MOLECULAR    Collection Time: 01/16/25 12:33 PM   Result Value Ref Range    POC Molecular Influenza A Ag Negative Negative    POC Molecular Influenza B Ag Negative Negative     Acceptable Yes      Patient Instructions   A cold is caused by a virus that can settle in your nose, throat or lungs. This causesa runny or stuffy nose and sneezing. You may also have a sore throat, cough, headache, fever and muscle aches. Different cold viruses last different lengthsof time, but the average time is 2 to 14 days.    Seek immediate medical care if you develop fever, chest pain, or shortness of breath.     Treatment: There is no cure for the common cold, there is only symptomatic care.     Antibiotics may be used to treat signs of a secondary infection, but they do not treat  the cold virus. Try these tips to keep yourself comfortable:                   -Get plenty of rest.                   -Drink plenty of fluids, at least 8 large glasses of fluid a day. Good fluid choices are water, fruit juices high in Vitamin C, tea, gelatin, or broths and soups. These help to keep mucus thin and ease congestion.                  -Use salt water gargle, cough drops or throat sprays to relieve throat pain. Mi ¼ to ½ teaspoon of salt in 1 cup of warm water for a salt water gargle  solution.                  -Use petroleum jelly or lip balm around lips  and nose to prevent chapping.                  -Use saline nose drops or spray to help ease congestion.    Over the Counter (OTC) Medicines:  Take over the counter medicines as needed to ease your signs.  Read labels carefully.  Use a product that treats only the signs that you have. Ask your pharmacist  for recommendations. Be sure to ask about possible interactions with other  medicines you are taking.  Common medicines used to treat signs of a cold include:     -Flonase daily.  -Claritin or Zyrtec daily.  -Mucinex every 12 hours -- Drink plenty of water while taking this medication.    - Cough suppressant, also called antitussive, such as dextromethorphan.  This medicine decreases your reflex and sensitivity to cough. This  medicine may be kept behind the pharmacy counter for purchase.    Cold and cough medicines often contain more than one type of medicine.  Ask the pharmacist for help to confirm that you are not using more than one  product with the same or similar ingredient. For example, some cold and  cough medicines have acetaminophen or ibuprofen in them to help lower a  fever or ease muscle aches. Do not take extra acetaminophen (Tylenol) or  ibuprofen (Advil, Motrin) if the cold or cough medicine has it as an  ingredient. Too much medicine could be harmful.    Take the correct dose as listed on the package. Do not take more than  recommended.    Use a Humidifier:  A cool mist humidifier can make breathing easier by thinning mucus. Do not use  a steam humidifier as hot water can cause burns if spilled.  Place the humidifier a few feet from the bed. Drain and clean each day with  soap and water to prevent bacteria and mold from growing.  Indoor humidity should not be above 50%. Stop using the humidifier if you  notice moisture on windows, walls or pictures.  You do not need to add any medicine to the humidifier.  If you cannot get a humidifier, place a pan of water next to heating vents and  refill the  water level daily. The water will evaporate and add moisture to the  Room.    How to prevent the spread of colds  -Wash your hands with soap and water or use alcohol based hand   often. Dry hands wet from washing with soap on a paper towel instead of cloth towel.  -Cough or sneeze into your elbow to avoid spreading germs.  -Wipe down common surfaces, such as door knobs and faucet handles, with a disinfectant spray.  -Do not share cups or utensils.        Please follow up with your Primary care provider within 2-5 days if your signs and symptoms have not resolved or worsen.      If your condition worsens or fails to improve we recommend that you receive another evaluation at the emergency room immediately or contact your primary medical clinic to discuss your concerns.   You must understand that you have received an Urgent Care treatment only and that you may be released before all of your medical problems are known or treated. You, the patient, will arrange for follow up care as instructed.   Tobacco Cessation  Smoking, vaping, and smokeless tobacco cause harm by raising blood pressure and increasing your risk of heart attack and stroke. Chewing tobacco increases your risk of cancer of the face and throat. Smoking not only raises the risk of cancer, but more often, causes emphysema. This crippling lung disease can cause constant shortness of breath and a need to use oxygen. Stopping smoking can make the difference between playing with your grandchildren outside and sitting by with your oxygen tank watching them.     You may already know your nicotine habit is dangerous, but perhaps you feel helpless or dont know where to start.    Here at [facility] we are invested in the improvement of your health. We would be happy to help you with smoking cessation. If you are interested in quitting and answer yes to   the questions below, we can provide you with FREE assistance to get you on your way.     ? Are you a  [place] resident?  ? Did you start smoking before September 1, 1988?  ? Are you ready to quit smoking?    Quitting doesnt have to be lonely -   [facility] Smoking Cessation program offers a supportive environment along with      FREE smoking cessation counseling to help get you through those rough patches   FREE or reduced medications* to help curb the cravings    You do not need to be an [facility] patient to participate in the program.  Ready? Call [phone] or toll free [phone] or visit [email or web address] to sign up for the program.